# Patient Record
(demographics unavailable — no encounter records)

---

## 2025-06-18 NOTE — HISTORY OF PRESENT ILLNESS
[de-identified] : 69 y/o F p/w post-nasal drip, rhinorrhea, and sinus headaches for the past year. States that symptoms have been worsening the past few months with thick sputum.  She reports that she is coughing all the time and feels sensation that she is choking d/t constant post-nasal drip. Noted sunday night she had issues with swallowing a pill she never had issues swallowing with despite drinking water.  OTC: nasal spray (unsure of name) with no relief. Has h/o non Hodgkin lymphoma (currently in remission, found in the stomach).

## 2025-06-18 NOTE — PROCEDURE
[Topical Lidocaine] : topical lidocaine [Oxymetazoline HCl] : oxymetazoline HCl [Flexible Endoscope] : examined with the flexible endoscope [Normal] : posterior cricoid area had healthy pink mucosa in the interarytenoid area and the esophageal inlet [de-identified] : done to eval for dysphagia, chronic posterior rhinorrhea findings: no mass, no vc nodule noted